# Patient Record
Sex: MALE | Race: BLACK OR AFRICAN AMERICAN | URBAN - METROPOLITAN AREA
[De-identification: names, ages, dates, MRNs, and addresses within clinical notes are randomized per-mention and may not be internally consistent; named-entity substitution may affect disease eponyms.]

---

## 2017-01-01 ENCOUNTER — NURSING HOME VISIT (OUTPATIENT)
Dept: GERIATRICS | Facility: CLINIC | Age: 82
End: 2017-01-01
Payer: MEDICARE

## 2017-01-01 VITALS — SYSTOLIC BLOOD PRESSURE: 185 MMHG | DIASTOLIC BLOOD PRESSURE: 84 MMHG

## 2017-01-01 DIAGNOSIS — R13.12 OROPHARYNGEAL DYSPHAGIA: ICD-10-CM

## 2017-01-01 DIAGNOSIS — N18.6 END STAGE RENAL DISEASE (H): ICD-10-CM

## 2017-01-01 DIAGNOSIS — C34.91 SQUAMOUS CELL CARCINOMA OF LUNG, STAGE IV, RIGHT (H): Primary | ICD-10-CM

## 2017-01-01 DIAGNOSIS — R52 PAIN: ICD-10-CM

## 2017-01-01 DIAGNOSIS — M79.89 SWELLING OF RIGHT UPPER EXTREMITY: ICD-10-CM

## 2017-01-01 DIAGNOSIS — Z51.5 HOSPICE CARE PATIENT: ICD-10-CM

## 2017-01-01 DIAGNOSIS — R68.2 DRY MOUTH: ICD-10-CM

## 2017-01-01 PROCEDURE — 99310 SBSQ NF CARE HIGH MDM 45: CPT | Mod: GV | Performed by: NURSE PRACTITIONER

## 2017-01-01 PROCEDURE — 99207 ZZC CDG-CORRECTLY CODED, REVIEWED AND AGREE: CPT | Performed by: NURSE PRACTITIONER

## 2017-01-01 RX ORDER — CHLORHEXIDINE GLUCONATE ORAL RINSE 1.2 MG/ML
30 SOLUTION DENTAL 2 TIMES DAILY
COMMUNITY

## 2017-01-01 RX ORDER — ALBUTEROL SULFATE 0.83 MG/ML
1 SOLUTION RESPIRATORY (INHALATION) EVERY 4 HOURS PRN
COMMUNITY

## 2017-05-15 NOTE — PROGRESS NOTES
[facility]  PRIMARY CARE PROVIDER AND CLINIC:  No primary care provider on file. No primary physician on file.  Chief Complaint   Patient presents with     Establish Care       HPI:    James Culp is a 87 year old  (2/3/1930),admitted to the [facility] from [facility].  Hospital stay 5/9/17 through 5/15/17.  Admitted to this facility for  medical management, nursing care and hospice.     ________________________________________________________________________________  Current issues are:      Hospice care patient/Squamous cell carcinoma of lung, stage IV, right (H)/Pain/Swelling of right upper extremity  As noted above  Patient followed by [facility]  On regimen of Oxycodone and Ativan for comfort    End stage renal disease (H)  Chronic - exacerbated by above noted dx  Most recent labs noted below    Dry mouth  2/2 comfort regimen  On regimen of Artificial saliva    Oropharyngeal dysphagia  As noted above  Diet as above - PO intake as tolerated      CODE STATUS/ADVANCE DIRECTIVES DISCUSSION:   DNR / DNI  Patient's living condition: lives with family, daughter     ALLERGIES:Review of patient's allergies indicates no known allergies.  PAST MEDICAL HISTORY:  has no past medical history on file.  PAST SURGICAL HISTORY:  has no past surgical history on file.  FAMILY HISTORY: family history is not on file.  SOCIAL HISTORY:      Post Discharge Medication Reconciliation Status: discharge medications reconciled, continue medications without change.  Current Outpatient Prescriptions   Medication Sig Dispense Refill     albuterol (2.5 MG/3ML) 0.083% neb solution Take 1 vial by nebulization every 4 hours as needed for shortness of breath / dyspnea or wheezing       ARTIFICIAL SALIVA MT Take 2 sprays by mouth every 2 hours as needed       chlorhexidine (PERIDEX) 0.12 % solution Swish and spit 30 mLs in mouth 2 times daily       OXYCODONE HCL PO Take 5 mg by mouth every 4 hours as  needed         ROS:  10 point ROS of systems including Constitutional, Eyes, Respiratory, Cardiovascular, Gastroenterology, Genitourinary, Integumentary, Muscularskeletal, Psychiatric were all negative except for pertinent positives noted in my HPI.    Exam:  /84  GENERAL APPEARANCE:  Alert, thin, somnolent, cooperative, elderly male resting in bed  ENT:  [place], throat/mouth:normal, mucous membranes dry, edentulous  EYES:  EOM, conjunctivae, lids, pupils and irises normal  NECK:  No adenopathy,masses or thyromegaly  RESP:  diminished breath sounds throughout, rhonchi BUL, expiratory wheezes, using accessory muscles  CV:  Palpation and auscultation of heart done , regular rate and rhythm, no murmur, rub, or gallop, +2 pedal pulses, RUE edema 2-3+  ABDOMEN:  normal bowel sounds, soft, nontender, no hepatosplenomegaly or other masses  M/S:   Gait and station abnormal - patient bed bound  Digits and nails abnormal - arthritic changes present  SKIN:  Inspection of skin and subcutaneous tissue baseline, Palpation of skin and subcutaneous tissue baseline, skin thin and dry  PSYCH:  oriented X 3, memory impaired , affect and mood normal    Lab/Diagnostic data: None available at this time.       ASSESSMENT/PLAN:  Hospice care patient/Squamous cell carcinoma of lung, stage IV, right (H)/Pain/Swelling of right upper extremity  Stable on current regimen; continue medications as currently ordered.  Continue management, assessment and recommendations per Hospice    End stage renal disease (H)  Stable without medical intervention    Dry mouth  Stable on current regimen; continue medications as currently ordered.    Oropharyngeal dysphagia  Altered diet as tolerated    Information reviewed:  Medications, vital signs, orders, nursing notes, problem list, hospital information. Facility MDS and care plan reviewed.     Total time spent with patient visit was 45 min including patient visit and review of past records. Greater than  50% of total time spent with counseling and coordinating care.    Electronically signed by:  KULDIP Carrington  [facility]

## 2017-05-31 PROBLEM — M79.89 SWELLING OF RIGHT UPPER EXTREMITY: Status: ACTIVE | Noted: 2017-05-31

## 2017-05-31 PROBLEM — Z51.5 HOSPICE CARE PATIENT: Status: ACTIVE | Noted: 2017-05-31

## 2017-05-31 PROBLEM — N18.6 END STAGE RENAL DISEASE (H): Status: ACTIVE | Noted: 2017-05-31

## 2017-05-31 PROBLEM — R68.2 DRY MOUTH: Status: ACTIVE | Noted: 2017-05-31

## 2017-05-31 PROBLEM — R13.12 OROPHARYNGEAL DYSPHAGIA: Status: ACTIVE | Noted: 2017-05-31

## 2017-05-31 PROBLEM — C34.91: Status: ACTIVE | Noted: 2017-05-31

## 2017-05-31 PROBLEM — R52 PAIN: Status: ACTIVE | Noted: 2017-05-31
